# Patient Record
Sex: MALE | Race: WHITE | NOT HISPANIC OR LATINO | Employment: FULL TIME | URBAN - METROPOLITAN AREA
[De-identification: names, ages, dates, MRNs, and addresses within clinical notes are randomized per-mention and may not be internally consistent; named-entity substitution may affect disease eponyms.]

---

## 2024-01-29 ENCOUNTER — APPOINTMENT (OUTPATIENT)
Dept: RADIOLOGY | Facility: MEDICAL CENTER | Age: 49
End: 2024-01-29
Payer: OTHER MISCELLANEOUS

## 2024-01-29 ENCOUNTER — OCCMED (OUTPATIENT)
Dept: URGENT CARE | Facility: MEDICAL CENTER | Age: 49
End: 2024-01-29
Payer: OTHER MISCELLANEOUS

## 2024-01-29 DIAGNOSIS — S49.91XA INJURY OF RIGHT SHOULDER, INITIAL ENCOUNTER: Primary | ICD-10-CM

## 2024-01-29 PROCEDURE — 73030 X-RAY EXAM OF SHOULDER: CPT

## 2024-01-29 PROCEDURE — G0382 LEV 3 HOSP TYPE B ED VISIT: HCPCS

## 2024-01-29 PROCEDURE — 99283 EMERGENCY DEPT VISIT LOW MDM: CPT

## 2024-02-16 ENCOUNTER — OFFICE VISIT (OUTPATIENT)
Dept: URGENT CARE | Facility: MEDICAL CENTER | Age: 49
End: 2024-02-16
Payer: COMMERCIAL

## 2024-02-16 VITALS
TEMPERATURE: 97.1 F | RESPIRATION RATE: 18 BRPM | OXYGEN SATURATION: 98 % | DIASTOLIC BLOOD PRESSURE: 97 MMHG | SYSTOLIC BLOOD PRESSURE: 153 MMHG | HEART RATE: 82 BPM

## 2024-02-16 DIAGNOSIS — M25.511 ACUTE PAIN OF RIGHT SHOULDER: Primary | ICD-10-CM

## 2024-02-16 PROCEDURE — 99213 OFFICE O/P EST LOW 20 MIN: CPT

## 2024-02-16 NOTE — PATIENT INSTRUCTIONS
Recommend rest, ice, elevation. Over the counter pain medication as directed on packaging as needed for pain (ex: Tylenol, ibuprofen).    Follow up with PCP in 3-5 days.  Proceed to  ER if symptoms worsen.    Shoulder Pain   AMBULATORY CARE:   Shoulder pain  is a common problem that can affect your daily activities. Pain can be caused by a problem within your shoulder, such as soreness of a tendon or bursa. A tendon is a cord of tough tissue that connects your muscles to your bones. The bursa is a fluid-filled sac that acts as a cushion between a bone and a tendon. Shoulder pain may also be caused by pain that spreads to your shoulder from another part of your body.       Seek care immediately if:   You have severe pain.    You cannot move your arm or shoulder.    You have numbness or tingling in your shoulder or arm.    Contact your healthcare provider if:   Your pain gets worse or does not go away with treatment.    You have trouble moving your arm or shoulder.    You have questions or concerns about your condition or care.    Treatment for shoulder pain  may include any of the following:  Acetaminophen  decreases pain and fever. It is available without a doctor's order. Ask how much to take and how often to take it. Follow directions. Read the labels of all other medicines you are using to see if they also contain acetaminophen, or ask your doctor or pharmacist. Acetaminophen can cause liver damage if not taken correctly.    NSAIDs , such as ibuprofen, help decrease swelling, pain, and fever. This medicine is available with or without a doctor's order. NSAIDs can cause stomach bleeding or kidney problems in certain people. If you take blood thinner medicine, always ask your healthcare provider if NSAIDs are safe for you. Always read the medicine label and follow directions.    A steroid injection  may help decrease pain and swelling.    Surgery  may be needed for long-term pain and loss of function.    Manage  your symptoms:   Apply ice  on your shoulder for 20 to 30 minutes every 2 hours or as directed. Use an ice pack, or put crushed ice in a plastic bag. Cover it with a towel before you apply it to your shoulder. Ice helps prevent tissue damage and decreases swelling and pain.    Apply heat if ice does not help your symptoms.  Apply heat on your shoulder for 20 to 30 minutes every 2 hours for as many days as directed. Heat helps decrease pain and muscle spasms.    Limit activities as directed.  Try to avoid repeated overhead movements.    Go to physical or occupational therapy as directed.  A physical therapist teaches you exercises to help improve movement and strength, and to decrease pain. An occupational therapist teaches you skills to help with your daily activities.    Prevent shoulder pain:   Maintain a good range of motion in your shoulder.  Ask your healthcare provider which exercises you should do on a regular basis after you have healed.     Stretch and strengthen your shoulder.  Use proper technique during exercises and sports.    Follow up with your healthcare provider or orthopedist as directed:  Write down your questions so you remember to ask them during your visits.   © Copyright [employer] 2023 Information is for End User's use only and may not be sold, redistributed or otherwise used for commercial purposes.  The above information is an  only. It is not intended as medical advice for individual conditions or treatments. Talk to your doctor, nurse or pharmacist before following any medical regimen to see if it is safe and effective for you.

## 2024-02-16 NOTE — LETTER
February 16, 2024     Patient: Joshua Lopez   YOB: 1975   Date of Visit: 2/16/2024       To Whom it May Concern:    Joshua Lopez was seen in my clinic on 2/16/2024. He may return to work on 2/16/2024 with no restrictions .    If you have any questions or concerns, please don't hesitate to call.         Sincerely,          Sonam Sanchez PA-C        CC: No Recipients

## 2024-02-16 NOTE — PROGRESS NOTES
[facility]        NAME: Joshua Lopez is a 48 y.o. male  : 1975    MRN: [id number]  DATE: 2024  TIME: 9:52 AM    Assessment and Plan   Acute pain of right shoulder [M25.511]  1. Acute pain of right shoulder          Patient with right shoulder pain from injury at work on 24. He was originally seen that date as a worker's compensation injury but his claim was denied. He presents today requesting a note to return to work full duty as his pain is almost fully resolved. Work note provided.    Patient Instructions     Recommend rest, ice, elevation. Over the counter pain medication as directed on packaging as needed for pain (ex: Tylenol, ibuprofen).    Follow up with PCP in 3-5 days.  Proceed to  ER if symptoms worsen.    Chief Complaint     Chief Complaint   Patient presents with    Shoulder Injury     Pt w/injury to right shoulder  while at work and WC claim denied here for note to return to work.  States he's been using his ice compression machine the last week and his pain has greatly improved.  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         History of Present Illness       Shoulder Injury   The incident occurred at work. The right shoulder is affected. Incident onset: 24. Injury mechanism: pulling on a rope to open a large overhead door. The quality of the pain is described as aching. The pain does not radiate. The pain is at a severity of 1/10. Pertinent negatives include no chest pain, muscle weakness, numbness or tingling. The symptoms are aggravated by movement. Treatments tried: ice compression, ibuprofen occasionally. The treatment provided significant relief.     Patient seen for worker's compensation claim on 24. His claim was denied. He presents today for a note to go back to work full duty, as his symptoms are improved.    Review of Systems   Review of Systems   Cardiovascular:  Negative for chest pain.   Musculoskeletal:  Positive for arthralgias. Negative for  joint swelling.   Skin:  Negative for color change.   Neurological:  Negative for tingling and numbness.         Current Medications     No current outpatient medications on file.    Current Allergies     Allergies as of 02/16/2024    (No Known Allergies)            The following portions of the patient's history were reviewed and updated as appropriate: allergies, current medications, past family history, past medical history, past social history, past surgical history and problem list.     History reviewed. No pertinent past medical history.    Past Surgical History:   Procedure Laterality Date    SHOULDER SURGERY Right     torn rotator cuff x3       History reviewed. No pertinent family history.      Medications have been verified.        Objective   /97   Pulse 82   Temp (!) 97.1 °F (36.2 °C) (Tympanic)   Resp 18   SpO2 98%        Physical Exam     Physical Exam  Vitals and nursing note reviewed.   Constitutional:       General: He is not in acute distress.     Appearance: Normal appearance. He is not ill-appearing.   Cardiovascular:      Rate and Rhythm: Normal rate and regular rhythm.      Pulses: Normal pulses.      Heart sounds: Normal heart sounds.   Pulmonary:      Effort: Pulmonary effort is normal.      Breath sounds: Normal breath sounds.   Musculoskeletal:      Right shoulder: No swelling, deformity, effusion, tenderness or bony tenderness. Decreased range of motion (about 90 degrees abduction - patient notes this is his normal ROM as he has had previous shoulder injuries and surgeries). Normal strength. Normal pulse.   Neurological:      Mental Status: He is alert.